# Patient Record
Sex: MALE | Race: WHITE | NOT HISPANIC OR LATINO | Employment: UNEMPLOYED | URBAN - METROPOLITAN AREA
[De-identification: names, ages, dates, MRNs, and addresses within clinical notes are randomized per-mention and may not be internally consistent; named-entity substitution may affect disease eponyms.]

---

## 2017-01-05 ENCOUNTER — OFFICE VISIT (OUTPATIENT)
Dept: INTERNAL MEDICINE | Facility: CLINIC | Age: 52
End: 2017-01-05

## 2017-01-05 VITALS
HEIGHT: 68 IN | SYSTOLIC BLOOD PRESSURE: 150 MMHG | WEIGHT: 190 LBS | RESPIRATION RATE: 16 BRPM | BODY MASS INDEX: 28.79 KG/M2 | DIASTOLIC BLOOD PRESSURE: 102 MMHG | TEMPERATURE: 97.6 F | HEART RATE: 80 BPM

## 2017-01-05 DIAGNOSIS — B19.20 HEPATITIS C VIRUS INFECTION, UNSPECIFIED CHRONICITY: Primary | ICD-10-CM

## 2017-01-05 DIAGNOSIS — I10 ESSENTIAL HYPERTENSION: ICD-10-CM

## 2017-01-05 DIAGNOSIS — E78.2 MIXED HYPERLIPIDEMIA: ICD-10-CM

## 2017-01-05 PROCEDURE — 99203 OFFICE O/P NEW LOW 30 MIN: CPT | Performed by: FAMILY MEDICINE

## 2017-01-05 RX ORDER — FLUOXETINE HYDROCHLORIDE 40 MG/1
40 CAPSULE ORAL DAILY
Qty: 90 CAPSULE | Refills: 3 | Status: SHIPPED | OUTPATIENT
Start: 2017-01-05

## 2017-01-05 RX ORDER — CHLORAL HYDRATE 500 MG
CAPSULE ORAL
COMMUNITY

## 2017-01-05 RX ORDER — NAPROXEN SODIUM 220 MG
220 TABLET ORAL 2 TIMES DAILY PRN
COMMUNITY

## 2017-01-05 RX ORDER — FLUOXETINE HYDROCHLORIDE 40 MG/1
40 CAPSULE ORAL DAILY
COMMUNITY
End: 2017-01-05 | Stop reason: SDUPTHER

## 2017-01-05 RX ORDER — LISINOPRIL 20 MG/1
20 TABLET ORAL DAILY
COMMUNITY
End: 2017-01-05 | Stop reason: SDUPTHER

## 2017-01-05 RX ORDER — THIAMINE HCL 100 MG
TABLET ORAL DAILY
COMMUNITY

## 2017-01-05 RX ORDER — LISINOPRIL 20 MG/1
20 TABLET ORAL DAILY
Qty: 90 TABLET | Refills: 3 | Status: SHIPPED | OUTPATIENT
Start: 2017-01-05

## 2017-01-05 RX ORDER — ASCORBIC ACID 500 MG
500 TABLET ORAL DAILY
COMMUNITY

## 2017-01-05 NOTE — PROGRESS NOTES
Alex Honeycutt is a 51 y.o. male.     Chief Complaint   Patient presents with   • Hypertension   • Depression         History of Present Illness   Patient is a new patient seen here for refill hypertension medicine lisinopril plus history of depression on chronic fluoxetine 40 mg daily.  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  He is searching for more employment.  He has severed package.    There is a history of diagnosis and treatment of hepatitis C with presumption of clearing.  Will get hepatitis B antibody plus hepatitis C by PCR.    We discussed screening labs today.    The following portions of the patient's history were reviewed and updated as appropriate: allergies, current medications, past social history and problem list.    Review of Systems   Constitutional: Negative.    HENT: Negative.    Eyes: Negative.    Respiratory: Negative.    Cardiovascular: Negative.    Gastrointestinal: Negative.    Endocrine: Negative.    Genitourinary: Negative.    Musculoskeletal: Negative.    Skin: Negative.    Allergic/Immunologic: Negative.    Neurological: Negative.    Hematological: Negative.    Psychiatric/Behavioral: Negative.        Objective   Vitals:    01/05/17 1448   BP: (!) 150/102   Pulse: 80   Resp: 16   Temp: 97.6 °F (36.4 °C)     Physical Exam   Constitutional: He is oriented to person, place, and time. He appears well-developed and well-nourished.   HENT:   Head: Normocephalic and atraumatic.   Right Ear: Tympanic membrane and external ear normal.   Left Ear: Tympanic membrane and external ear normal.   Nose: Nose normal.   Mouth/Throat: Oropharynx is clear and moist.   Eyes: Conjunctivae and EOM are normal. Pupils are equal, round, and reactive to light.   Neck: Normal range of motion. Neck supple. No JVD present. No thyromegaly present.   Cardiovascular: Normal rate, regular rhythm, normal heart sounds and intact distal pulses.    Pulmonary/Chest: Effort normal and breath  sounds normal.   Abdominal: Soft. Bowel sounds are normal.   Musculoskeletal:        Right knee: He exhibits decreased range of motion.        Left knee: He exhibits decreased range of motion.   Lymphadenopathy:     He has no cervical adenopathy.   Neurological: He is alert and oriented to person, place, and time. No cranial nerve deficit. Coordination normal.   Skin: Skin is warm and dry. No rash noted.   Psychiatric: He has a normal mood and affect. His behavior is normal. Judgment and thought content normal.   Vitals reviewed.      Assessment/Plan   Problem List Items Addressed This Visit     None      Visit Diagnoses     Hepatitis C virus infection, unspecified chronicity    -  Primary    Relevant Orders    Hepatitis C RNA, Quantitative, PCR (graph)    CBC & Differential    Comprehensive Metabolic Panel    Lipid Panel With / Chol / HDL Ratio    Hepatitis B Surface Antibody    Essential hypertension        Relevant Medications    lisinopril (PRINIVIL,ZESTRIL) 20 MG tablet    Other Relevant Orders    Hepatitis C RNA, Quantitative, PCR (graph)    CBC & Differential    Comprehensive Metabolic Panel    Lipid Panel With / Chol / HDL Ratio    Mixed hyperlipidemia        Relevant Orders    Hepatitis C RNA, Quantitative, PCR (graph)    CBC & Differential    Comprehensive Metabolic Panel    Lipid Panel With / Chol / HDL Ratio       plan: Refill fluoxetine 40 mg daily #90 refill ×3.    Refill lisinopril 20 mg daily #90 refill ×3.    We'll hepatitis C by PCR plus CBC plus CMP plus lipid plus hepatitis B antibody.

## 2017-01-05 NOTE — MR AVS SNAPSHOT
Ted Honeycutt   1/5/2017 3:00 PM   Office Visit    Dept Phone:  [phone]   Encounter #:  [id number]    Provider:  Andrew Ruff Jr., MD   Department:  [facility] INTERNAL MEDICINE                Your Full Care Plan              Where to Get Your Medications      These medications were sent to [address] - [address] [place] [address] RD. - [phone]  - [phone] [place] [address]     Phone:  [phone]     FLUoxetine 40 MG capsule    lisinopril 20 MG tablet            Your Updated Medication List          This list is accurate as of: 1/5/17  3:43 PM.  Always use your most recent med list.                CENTRUM SILVER PO       fish oil 1000 MG capsule capsule       FLUoxetine 40 MG capsule   Commonly known as:  PROzac   Take 1 capsule by mouth Daily.       GLUCOSAMINE & FISH OIL PO       lisinopril 20 MG tablet   Commonly known as:  PRINIVIL,ZESTRIL   Take 1 tablet by mouth Daily.       naproxen sodium 220 MG tablet   Commonly known as:  ALEVE       vitamin B-12 2500 MCG sublingual tablet tablet   Commonly known as:  CYANOCOBALAMIN       vitamin C 500 MG tablet   Commonly known as:  ASCORBIC ACID               We Performed the Following     CBC & Differential     Comprehensive Metabolic Panel     Hepatitis B Surface Antibody     Hepatitis C RNA, Quantitative, PCR (graph)     Lipid Panel With / Chol / HDL Ratio       You Were Diagnosed With        Codes Comments    Hepatitis C virus infection, unspecified chronicity    -  Primary ICD-10-CM: B19.20  ICD-9-CM: 070.70     Essential hypertension     ICD-10-CM: I10  ICD-9-CM: 401.9     Mixed hyperlipidemia     ICD-10-CM: E78.2  ICD-9-CM: 272.2       Instructions     None    Patient Instructions History      Upcoming Appointments     Visit Type Date Time Department    NEW PATIENT 1/5/2017  3:00 PM [facility]ARE[facility] MAJANO 1 1760    OFFICE VISIT 7/6/2017  4:00 PM  "K  KRE 1 4003      MyChart Signup     Our records indicate that you have declined [facility] [employer] signup. If you would like to sign up for [employer], please email [email or web address] or call [phone] to obtain an activation code.             Other Info from Your Visit           Your Appointments     Jul 06, 2017  4:00 PM EDT   Office Visit with Andrew Ruff Jr., MD   [facility] INTERNAL MEDICINE (--)    [address]  [facility] [address] [phone]           Arrive 15 minutes prior to appointment.              Allergies     No Known Allergies      Reason for Visit     Hypertension     Depression           Vital Signs     Blood Pressure Pulse Temperature Respirations Height Weight    150/102 (BP Location: Left arm, Patient Position: Sitting, Cuff Size: Adult) 80 97.6 °F (36.4 °C) (Tympanic) 16 67.5\" (171.5 cm) 190 lb (86.2 kg)    Body Mass Index Smoking Status                29.32 kg/m2 Current Every Day Smoker          Problems and Diagnoses Noted     Hepatitis C    -  Primary    High blood pressure        Mixed hyperlipidemia            "

## 2017-01-06 LAB — HBV SURFACE AB SER QL: REACTIVE

## 2017-01-07 LAB
ALBUMIN SERPL-MCNC: 4.7 G/DL (ref 3.5–5.2)
ALBUMIN/GLOB SERPL: 2 G/DL
ALP SERPL-CCNC: 84 U/L (ref 39–117)
ALT SERPL-CCNC: 40 U/L (ref 1–41)
AST SERPL-CCNC: 23 U/L (ref 1–40)
BASOPHILS # BLD AUTO: 0.01 10*3/MM3 (ref 0–0.2)
BASOPHILS NFR BLD AUTO: 0.2 % (ref 0–1.5)
BILIRUB SERPL-MCNC: 0.5 MG/DL (ref 0.1–1.2)
BUN SERPL-MCNC: 11 MG/DL (ref 6–20)
BUN/CREAT SERPL: 11.7 (ref 7–25)
CALCIUM SERPL-MCNC: 9.8 MG/DL (ref 8.6–10.5)
CHLORIDE SERPL-SCNC: 101 MMOL/L (ref 98–107)
CHOLEST SERPL-MCNC: 170 MG/DL (ref 0–200)
CHOLEST/HDLC SERPL: 3.54 {RATIO}
CO2 SERPL-SCNC: 26 MMOL/L (ref 22–29)
CREAT SERPL-MCNC: 0.94 MG/DL (ref 0.76–1.27)
EOSINOPHIL # BLD AUTO: 0.05 10*3/MM3 (ref 0–0.7)
EOSINOPHIL NFR BLD AUTO: 0.9 % (ref 0.3–6.2)
ERYTHROCYTE [DISTWIDTH] IN BLOOD BY AUTOMATED COUNT: 12.2 % (ref 11.5–14.5)
GLOBULIN SER CALC-MCNC: 2.3 GM/DL
GLUCOSE SERPL-MCNC: 88 MG/DL (ref 65–99)
HCT VFR BLD AUTO: 46 % (ref 40.4–52.2)
HCV RNA SERPL NAA+PROBE-ACNC: NORMAL IU/ML
HDLC SERPL-MCNC: 48 MG/DL (ref 40–60)
HGB BLD-MCNC: 16.1 G/DL (ref 13.7–17.6)
IMM GRANULOCYTES # BLD: 0.03 10*3/MM3 (ref 0–0.03)
IMM GRANULOCYTES NFR BLD: 0.5 % (ref 0–0.5)
LDLC SERPL CALC-MCNC: 84 MG/DL (ref 0–100)
LYMPHOCYTES # BLD AUTO: 1.07 10*3/MM3 (ref 0.9–4.8)
LYMPHOCYTES NFR BLD AUTO: 18.5 % (ref 19.6–45.3)
MCH RBC QN AUTO: 31.2 PG (ref 27–32.7)
MCHC RBC AUTO-ENTMCNC: 35 G/DL (ref 32.6–36.4)
MCV RBC AUTO: 89.1 FL (ref 79.8–96.2)
MONOCYTES # BLD AUTO: 0.6 10*3/MM3 (ref 0.2–1.2)
MONOCYTES NFR BLD AUTO: 10.4 % (ref 5–12)
NEUTROPHILS # BLD AUTO: 4.03 10*3/MM3 (ref 1.9–8.1)
NEUTROPHILS NFR BLD AUTO: 69.5 % (ref 42.7–76)
PLATELET # BLD AUTO: 180 10*3/MM3 (ref 140–500)
POTASSIUM SERPL-SCNC: 4.3 MMOL/L (ref 3.5–5.2)
PROT SERPL-MCNC: 7 G/DL (ref 6–8.5)
RBC # BLD AUTO: 5.16 10*6/MM3 (ref 4.6–6)
SODIUM SERPL-SCNC: 141 MMOL/L (ref 136–145)
TEST INFORMATION: NORMAL
TRIGL SERPL-MCNC: 191 MG/DL (ref 0–150)
VLDLC SERPL CALC-MCNC: 38.2 MG/DL (ref 5–40)
WBC # BLD AUTO: 5.79 10*3/MM3 (ref 4.5–10.7)

## 2017-01-20 ENCOUNTER — TELEPHONE (OUTPATIENT)
Dept: INTERNAL MEDICINE | Facility: CLINIC | Age: 52
End: 2017-01-20